# Patient Record
Sex: MALE | URBAN - METROPOLITAN AREA
[De-identification: names, ages, dates, MRNs, and addresses within clinical notes are randomized per-mention and may not be internally consistent; named-entity substitution may affect disease eponyms.]

---

## 2022-01-05 ENCOUNTER — TELEPHONE (OUTPATIENT)
Dept: NEUROLOGY | Facility: CLINIC | Age: 57
End: 2022-01-05

## 2022-01-05 NOTE — TELEPHONE ENCOUNTER
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

## 2022-02-03 ENCOUNTER — TELEPHONE (OUTPATIENT)
Dept: NEUROLOGY | Facility: CLINIC | Age: 57
End: 2022-02-03

## 2022-02-03 NOTE — TELEPHONE ENCOUNTER
Vito Aranda called to schedule an appointment for Cherie Doty, she advised that it was a referral from the Provider in the facility [facility] for lower back & neck pain  I asked if she could fax over a copy of the referral and any reports & records that indicate the diagnosis  I also asked if she could provide me with the code on the referral  ICD [id number]    She started getting frustrated with me and asked why is this becoming such a hard process  I explained to her that with code she provided that patients are normally seen at [facility] or Neurosurgery depending on the patient's case  I advised Ms Vito Aranda that once we received everything she has to fax us that we will have a provider go over it to let us know exactly who Mr Cherie Doty should be scheduled with